# Patient Record
Sex: MALE | Race: WHITE | URBAN - METROPOLITAN AREA
[De-identification: names, ages, dates, MRNs, and addresses within clinical notes are randomized per-mention and may not be internally consistent; named-entity substitution may affect disease eponyms.]

---

## 2024-07-09 ENCOUNTER — APPOINTMENT (OUTPATIENT)
Dept: CT IMAGING | Age: 84
End: 2024-07-09
Payer: COMMERCIAL

## 2024-07-09 ENCOUNTER — HOSPITAL ENCOUNTER (EMERGENCY)
Age: 84
Discharge: HOME | End: 2024-07-09
Payer: COMMERCIAL

## 2024-07-09 VITALS
BODY MASS INDEX: 33.33 KG/M2 | TEMPERATURE: 98.2 F | DIASTOLIC BLOOD PRESSURE: 63 MMHG | HEART RATE: 65 BPM | OXYGEN SATURATION: 94 % | RESPIRATION RATE: 20 BRPM | SYSTOLIC BLOOD PRESSURE: 124 MMHG | WEIGHT: 225 LBS | HEIGHT: 69 IN

## 2024-07-09 DIAGNOSIS — S09.90XA CLOSED HEAD INJURY, INITIAL ENCOUNTER: ICD-10-CM
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
## 2024-07-09 DIAGNOSIS — S01.21XA LACERATION OF NOSE, INITIAL ENCOUNTER: Primary | ICD-10-CM

## 2024-07-09 DIAGNOSIS — S00.03XA HEMATOMA OF SCALP, INITIAL ENCOUNTER: ICD-10-CM

## 2024-07-09 PROCEDURE — 12011 RPR F/E/E/N/L/M 2.5 CM/<: CPT

## 2024-07-09 PROCEDURE — 99284 EMERGENCY DEPT VISIT MOD MDM: CPT

## 2024-07-09 PROCEDURE — 6360000002 HC RX W HCPCS: Performed by: PHYSICIAN ASSISTANT

## 2024-07-09 PROCEDURE — 90715 TDAP VACCINE 7 YRS/> IM: CPT | Performed by: PHYSICIAN ASSISTANT

## 2024-07-09 PROCEDURE — 72125 CT NECK SPINE W/O DYE: CPT

## 2024-07-09 PROCEDURE — 70450 CT HEAD/BRAIN W/O DYE: CPT

## 2024-07-09 PROCEDURE — 90471 IMMUNIZATION ADMIN: CPT | Performed by: PHYSICIAN ASSISTANT

## 2024-07-09 RX ADMIN — TETANUS TOXOID, REDUCED DIPHTHERIA TOXOID AND ACELLULAR PERTUSSIS VACCINE, ADSORBED 0.5 ML: 5; 2.5; 8; 8; 2.5 SUSPENSION INTRAMUSCULAR at 18:09

## 2024-07-09 NOTE — ED PROVIDER NOTES
draped in sterile fashion cleansed with chlorhexidine and rinsed and irrigated with saline.  No foreign material found in the wound.  I placed 1 stitch through the vertical laceration measuring 3 mm.  I then placed 2 stitches in the more transverse laceration and somewhat more to the right of midline measuring 1/2 cm.  Patient and son pleased with the results.     Procedures    CRITICAL CARE TIME (.cctime)       PAST MEDICAL HISTORY      has a past medical history of Atrial fibrillation (HCC), Hyperlipidemia, and Hypertension.     EMERGENCY DEPARTMENT COURSE and DIFFERENTIAL DIAGNOSIS/MDM:   Vitals:    Vitals:    07/09/24 1737   BP: 124/63   Pulse: 65   Resp: 20   Temp: 98.2 °F (36.8 °C)   TempSrc: Oral   SpO2: 94%   Weight: 102.1 kg (225 lb)   Height: 1.74 m (5' 8.5\")       Patient was given the following medications:  Medications   Tetanus-Diphth-Acell Pertussis (BOOSTRIX) injection 0.5 mL (0.5 mLs IntraMUSCular Given 7/9/24 1809)             Is this patient to be included in the SEP-1 Core Measure due to severe sepsis or septic shock?   No   Exclusion criteria - the patient is NOT to be included for SEP-1 Core Measure due to:  Infection is not suspected    Chronic Conditions affecting care: On Eliquis for atrial fibrillation.   has a past medical history of Atrial fibrillation (HCC), Hyperlipidemia, and Hypertension.    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  Head CT scan and neck CT scan negative.  On Eliquis 5 mg twice daily for atrial fibrillation.  No LOC or vomiting.  No headache or neck pain complaint.  Primary closure of nasal laceration.  See before-and-after pictures.    Disposition Considerations (tests considered but not done, Admit vs D/C, Shared Decision Making, Pt Expectation of

## 2024-07-09 NOTE — ED TRIAGE NOTES
85y/o male presents to the ED with facial injury and nose injury s/p fall today 1300. Pt states he tripped over uneven concrete, fell forward onto knees left shoulder and hit his face. Pt with controlled bleeding to nose and road rash to forehead. +pain to nose. Denies any LOC. -n/v/f/c/d.

## 2024-07-09 NOTE — DISCHARGE INSTRUCTIONS
I did place 3 stitches of 5-0 chromic.  These will dissolve in about a week.  I do not recommend antibiotic ointment for 1 to 2 days.  Thereafter very thin application sporadically.  Review information below.  Ice to the tender sites.  Tylenol for pain control.  Sleep with head of bed a bit elevated to prevent swelling.